# Patient Record
Sex: MALE | Race: BLACK OR AFRICAN AMERICAN | NOT HISPANIC OR LATINO
[De-identification: names, ages, dates, MRNs, and addresses within clinical notes are randomized per-mention and may not be internally consistent; named-entity substitution may affect disease eponyms.]

---

## 2017-06-13 PROBLEM — Z00.00 ENCOUNTER FOR PREVENTIVE HEALTH EXAMINATION: Status: ACTIVE | Noted: 2017-06-13

## 2017-06-20 ENCOUNTER — APPOINTMENT (OUTPATIENT)
Dept: ORTHOPEDIC SURGERY | Facility: CLINIC | Age: 72
End: 2017-06-20

## 2017-06-20 DIAGNOSIS — Z78.9 OTHER SPECIFIED HEALTH STATUS: ICD-10-CM

## 2017-06-20 DIAGNOSIS — Z86.39 PERSONAL HISTORY OF OTHER ENDOCRINE, NUTRITIONAL AND METABOLIC DISEASE: ICD-10-CM

## 2017-06-20 DIAGNOSIS — Z86.79 PERSONAL HISTORY OF OTHER DISEASES OF THE CIRCULATORY SYSTEM: ICD-10-CM

## 2017-06-20 DIAGNOSIS — Z85.46 PERSONAL HISTORY OF MALIGNANT NEOPLASM OF PROSTATE: ICD-10-CM

## 2017-06-21 VITALS
HEART RATE: 97 BPM | DIASTOLIC BLOOD PRESSURE: 77 MMHG | BODY MASS INDEX: 31.34 KG/M2 | SYSTOLIC BLOOD PRESSURE: 119 MMHG | HEIGHT: 66 IN | WEIGHT: 195 LBS

## 2017-07-18 ENCOUNTER — APPOINTMENT (OUTPATIENT)
Dept: ORTHOPEDIC SURGERY | Facility: CLINIC | Age: 72
End: 2017-07-18

## 2017-07-18 DIAGNOSIS — M84.521A PATHOLOGICAL FRACTURE IN NEOPLASTIC DISEASE, RIGHT HUMERUS, INITIAL ENCOUNTER FOR FRACTURE: ICD-10-CM

## 2017-07-18 PROBLEM — Z78.9 DOES NOT USE ILLICIT DRUGS: Status: ACTIVE | Noted: 2017-06-21

## 2017-07-18 PROBLEM — Z86.79 HISTORY OF HYPERTENSION: Status: RESOLVED | Noted: 2017-06-21 | Resolved: 2017-07-18

## 2017-07-18 PROBLEM — Z78.9 CONSUMES ALCOHOL OCCASIONALLY: Status: ACTIVE | Noted: 2017-06-21

## 2017-07-18 PROBLEM — Z78.9 EXERCISES OCCASIONALLY: Status: ACTIVE | Noted: 2017-06-21

## 2017-07-18 PROBLEM — Z86.39 HISTORY OF DIABETES MELLITUS: Status: RESOLVED | Noted: 2017-06-21 | Resolved: 2017-07-18

## 2017-07-18 PROBLEM — Z85.46 HISTORY OF MALIGNANT NEOPLASM OF PROSTATE: Status: RESOLVED | Noted: 2017-06-21 | Resolved: 2017-07-18

## 2017-07-18 PROBLEM — Z78.9 CURRENT NON-SMOKER: Status: ACTIVE | Noted: 2017-06-21

## 2017-07-18 RX ORDER — BICALUTAMIDE 50 MG/1
TABLET ORAL
Refills: 0 | Status: ACTIVE | COMMUNITY

## 2017-07-18 RX ORDER — ACARBOSE 25 MG/1
TABLET ORAL
Refills: 0 | Status: ACTIVE | COMMUNITY

## 2017-07-18 RX ORDER — SITAGLIPTIN AND METFORMIN HYDROCHLORIDE 50; 1000 MG/1; MG/1
TABLET, FILM COATED ORAL
Refills: 0 | Status: ACTIVE | COMMUNITY

## 2017-07-18 RX ORDER — PYRITHIONE ZINC 1 G/ML
SHAMPOO TOPICAL
Refills: 0 | Status: ACTIVE | COMMUNITY

## 2017-07-18 RX ORDER — PREDNISONE 50 MG/1
TABLET ORAL
Refills: 0 | Status: ACTIVE | COMMUNITY

## 2017-07-18 RX ORDER — SIMVASTATIN 80 MG/1
TABLET, FILM COATED ORAL
Refills: 0 | Status: ACTIVE | COMMUNITY

## 2017-07-18 RX ORDER — PETROLATUM,WHITE/LANOLIN
OINTMENT (GRAM) TOPICAL
Refills: 0 | Status: ACTIVE | COMMUNITY

## 2017-07-18 RX ORDER — LEUPROLIDE ACETATE 45 MG
KIT INTRAMUSCULAR
Refills: 0 | Status: ACTIVE | COMMUNITY

## 2017-07-18 RX ORDER — FEBUXOSTAT 80 MG/1
TABLET ORAL
Refills: 0 | Status: ACTIVE | COMMUNITY

## 2017-07-18 RX ORDER — INSULIN GLARGINE 100 [IU]/ML
INJECTION, SOLUTION SUBCUTANEOUS
Refills: 0 | Status: ACTIVE | COMMUNITY

## 2017-07-18 RX ORDER — ENALAPRIL MALEATE 5 MG/1
TABLET ORAL
Refills: 0 | Status: ACTIVE | COMMUNITY

## 2017-07-18 RX ORDER — ASPIRIN 325 MG/1
TABLET, FILM COATED ORAL
Refills: 0 | Status: ACTIVE | COMMUNITY

## 2017-09-15 ENCOUNTER — OUTPATIENT (OUTPATIENT)
Dept: OUTPATIENT SERVICES | Facility: HOSPITAL | Age: 72
LOS: 1 days | End: 2017-09-15

## 2017-09-15 VITALS
HEART RATE: 100 BPM | TEMPERATURE: 97 F | SYSTOLIC BLOOD PRESSURE: 110 MMHG | RESPIRATION RATE: 16 BRPM | WEIGHT: 184.09 LBS | DIASTOLIC BLOOD PRESSURE: 70 MMHG | HEIGHT: 67.5 IN

## 2017-09-15 DIAGNOSIS — I10 ESSENTIAL (PRIMARY) HYPERTENSION: ICD-10-CM

## 2017-09-15 DIAGNOSIS — Z98.890 OTHER SPECIFIED POSTPROCEDURAL STATES: Chronic | ICD-10-CM

## 2017-09-15 DIAGNOSIS — S42.309A UNSPECIFIED FRACTURE OF SHAFT OF HUMERUS, UNSPECIFIED ARM, INITIAL ENCOUNTER FOR CLOSED FRACTURE: ICD-10-CM

## 2017-09-15 DIAGNOSIS — E11.9 TYPE 2 DIABETES MELLITUS WITHOUT COMPLICATIONS: ICD-10-CM

## 2017-09-15 DIAGNOSIS — M84.521K: ICD-10-CM

## 2017-09-15 LAB
BLD GP AB SCN SERPL QL: NEGATIVE — SIGNIFICANT CHANGE UP
RH IG SCN BLD-IMP: POSITIVE — SIGNIFICANT CHANGE UP

## 2017-09-15 RX ORDER — GLIMEPIRIDE 1 MG
1 TABLET ORAL
Qty: 0 | Refills: 0 | COMMUNITY

## 2017-09-15 RX ORDER — ACARBOSE 25 MG/1
1 TABLET ORAL
Qty: 0 | Refills: 0 | COMMUNITY

## 2017-09-15 NOTE — H&P PST ADULT - MUSCULOSKELETAL
details… detailed exam decreased ROM due to pain/diminished strength/right arm & right leg/decreased ROM/no joint warmth/no calf tenderness/ROM intact

## 2017-09-15 NOTE — H&P PST ADULT - ATTENDING COMMENTS
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  Jet Alfred MD  Musculoskeletal Oncology  [phone]

## 2017-09-15 NOTE — H&P PST ADULT - NEGATIVE ENMT SYMPTOMS
no hearing difficulty/no nose bleeds/no vertigo/no nasal discharge/no recurrent cold sores/no sinus symptoms/no tinnitus/no post-nasal discharge/no ear pain

## 2017-09-15 NOTE — H&P PST ADULT - PROBLEM SELECTOR PLAN 2
instructed to take 27 units Lantus (80%) PM prior to surgery & last dose Janumet Day prior to surgery. No Insulin or Janumet AM of surgery  Monitor Blood Glucose stat AM of surgery

## 2017-09-15 NOTE — H&P PST ADULT - NSANTHOSAYNRD_GEN_A_CORE
No. GARFIELD screening performed.  STOP BANG Legend: 0-2 = LOW Risk; 3-4 = INTERMEDIATE Risk; 5-8 = HIGH Risk

## 2017-09-15 NOTE — H&P PST ADULT - RS GEN PE MLT RESP DETAILS PC
airway patent/good air movement/clear to auscultation bilaterally/no rales/respirations non-labored/breath sounds equal/no rhonchi

## 2017-09-15 NOTE — H&P PST ADULT - PROBLEM SELECTOR PLAN 1
scheduled for ORIF right humerus, allograft on 09/20/17  pending type & screen, cardiac clearance from Dr Acuña   Surgical scrub & preop instructions given & explained, pt verbalized understanding

## 2017-09-15 NOTE — H&P PST ADULT - PMH
DM (diabetes mellitus)    DVT (deep venous thrombosis)  Right leg - 10 years ago  HTN (hypertension)    Hyperlipidemia    Prostate cancer  radiation & chemotherapy Diabetic neuropathy    DM (diabetes mellitus)    DVT (deep venous thrombosis)  Right leg - 10 years ago  HTN (hypertension)    Hyperlipidemia    Prostate cancer  radiation & chemotherapy

## 2017-09-19 NOTE — ASU PATIENT PROFILE, ADULT - PMH
Diabetic neuropathy    DM (diabetes mellitus)    DVT (deep venous thrombosis)  Right leg - 10 years ago  HTN (hypertension)    Hyperlipidemia    Prostate cancer  radiation & chemotherapy

## 2017-09-20 ENCOUNTER — INPATIENT (INPATIENT)
Facility: HOSPITAL | Age: 72
LOS: 0 days | Discharge: HOME | End: 2017-09-21
Attending: ORTHOPAEDIC SURGERY | Admitting: ORTHOPAEDIC SURGERY
Payer: MEDICARE

## 2017-09-20 ENCOUNTER — APPOINTMENT (OUTPATIENT)
Dept: ORTHOPEDIC SURGERY | Facility: HOSPITAL | Age: 72
End: 2017-09-20

## 2017-09-20 ENCOUNTER — TRANSCRIPTION ENCOUNTER (OUTPATIENT)
Age: 72
End: 2017-09-20

## 2017-09-20 ENCOUNTER — RESULT REVIEW (OUTPATIENT)
Age: 72
End: 2017-09-20

## 2017-09-20 VITALS
SYSTOLIC BLOOD PRESSURE: 95 MMHG | OXYGEN SATURATION: 100 % | HEIGHT: 67.5 IN | DIASTOLIC BLOOD PRESSURE: 63 MMHG | HEART RATE: 86 BPM | RESPIRATION RATE: 16 BRPM | TEMPERATURE: 99 F | WEIGHT: 184.09 LBS

## 2017-09-20 DIAGNOSIS — Z98.890 OTHER SPECIFIED POSTPROCEDURAL STATES: Chronic | ICD-10-CM

## 2017-09-20 DIAGNOSIS — M84.521K: ICD-10-CM

## 2017-09-20 LAB
GRAM STN WND: SIGNIFICANT CHANGE UP
RH IG SCN BLD-IMP: POSITIVE — SIGNIFICANT CHANGE UP
SPECIMEN SOURCE: SIGNIFICANT CHANGE UP

## 2017-09-20 PROCEDURE — 24430 RPR NON/MAL HUMERUS WO GRAFT: CPT | Mod: RT

## 2017-09-20 PROCEDURE — 76000 FLUOROSCOPY <1 HR PHYS/QHP: CPT | Mod: 26

## 2017-09-20 PROCEDURE — 88341 IMHCHEM/IMCYTCHM EA ADD ANTB: CPT | Mod: 26

## 2017-09-20 PROCEDURE — 88305 TISSUE EXAM BY PATHOLOGIST: CPT | Mod: 26

## 2017-09-20 PROCEDURE — 88342 IMHCHEM/IMCYTCHM 1ST ANTB: CPT | Mod: 26

## 2017-09-20 PROCEDURE — 38221 DX BONE MARROW BIOPSIES: CPT

## 2017-09-20 PROCEDURE — 88311 DECALCIFY TISSUE: CPT | Mod: 26

## 2017-09-20 PROCEDURE — 64708 REVISE ARM/LEG NERVE: CPT | Mod: 59,RT

## 2017-09-20 RX ORDER — OXYCODONE HYDROCHLORIDE 5 MG/1
1 TABLET ORAL
Qty: 0 | Refills: 0 | COMMUNITY

## 2017-09-20 RX ORDER — SENNA PLUS 8.6 MG/1
2 TABLET ORAL AT BEDTIME
Qty: 0 | Refills: 0 | Status: DISCONTINUED | OUTPATIENT
Start: 2017-09-20 | End: 2017-09-21

## 2017-09-20 RX ORDER — ONDANSETRON 8 MG/1
4 TABLET, FILM COATED ORAL EVERY 6 HOURS
Qty: 0 | Refills: 0 | Status: DISCONTINUED | OUTPATIENT
Start: 2017-09-20 | End: 2017-09-21

## 2017-09-20 RX ORDER — OXYCODONE HYDROCHLORIDE 5 MG/1
5 TABLET ORAL EVERY 4 HOURS
Qty: 0 | Refills: 0 | Status: DISCONTINUED | OUTPATIENT
Start: 2017-09-20 | End: 2017-09-20

## 2017-09-20 RX ORDER — FENTANYL CITRATE 50 UG/ML
50 INJECTION INTRAVENOUS
Qty: 0 | Refills: 0 | Status: DISCONTINUED | OUTPATIENT
Start: 2017-09-20 | End: 2017-09-20

## 2017-09-20 RX ORDER — ATORVASTATIN CALCIUM 80 MG/1
1 TABLET, FILM COATED ORAL
Qty: 0 | Refills: 0 | COMMUNITY

## 2017-09-20 RX ORDER — MORPHINE SULFATE 50 MG/1
4 CAPSULE, EXTENDED RELEASE ORAL EVERY 4 HOURS
Qty: 0 | Refills: 0 | Status: DISCONTINUED | OUTPATIENT
Start: 2017-09-20 | End: 2017-09-20

## 2017-09-20 RX ORDER — ACETAMINOPHEN 500 MG
2 TABLET ORAL
Qty: 0 | Refills: 0 | COMMUNITY

## 2017-09-20 RX ORDER — INFLUENZA VIRUS VACCINE 15; 15; 15; 15 UG/.5ML; UG/.5ML; UG/.5ML; UG/.5ML
0.5 SUSPENSION INTRAMUSCULAR ONCE
Qty: 0 | Refills: 0 | Status: COMPLETED | OUTPATIENT
Start: 2017-09-20 | End: 2017-09-21

## 2017-09-20 RX ORDER — INSULIN GLARGINE 100 [IU]/ML
34 INJECTION, SOLUTION SUBCUTANEOUS AT BEDTIME
Qty: 0 | Refills: 0 | Status: DISCONTINUED | OUTPATIENT
Start: 2017-09-20 | End: 2017-09-21

## 2017-09-20 RX ORDER — OXYCODONE HYDROCHLORIDE 5 MG/1
5 TABLET ORAL EVERY 4 HOURS
Qty: 0 | Refills: 0 | Status: DISCONTINUED | OUTPATIENT
Start: 2017-09-20 | End: 2017-09-21

## 2017-09-20 RX ORDER — DEXTROSE 50 % IN WATER 50 %
25 SYRINGE (ML) INTRAVENOUS ONCE
Qty: 0 | Refills: 0 | Status: DISCONTINUED | OUTPATIENT
Start: 2017-09-20 | End: 2017-09-21

## 2017-09-20 RX ORDER — MORPHINE SULFATE 50 MG/1
2 CAPSULE, EXTENDED RELEASE ORAL EVERY 4 HOURS
Qty: 0 | Refills: 0 | Status: DISCONTINUED | OUTPATIENT
Start: 2017-09-20 | End: 2017-09-21

## 2017-09-20 RX ORDER — GLUCAGON INJECTION, SOLUTION 0.5 MG/.1ML
1 INJECTION, SOLUTION SUBCUTANEOUS ONCE
Qty: 0 | Refills: 0 | Status: DISCONTINUED | OUTPATIENT
Start: 2017-09-20 | End: 2017-09-21

## 2017-09-20 RX ORDER — SODIUM CHLORIDE 9 MG/ML
1000 INJECTION, SOLUTION INTRAVENOUS
Qty: 0 | Refills: 0 | Status: DISCONTINUED | OUTPATIENT
Start: 2017-09-20 | End: 2017-09-21

## 2017-09-20 RX ORDER — OXYCODONE HYDROCHLORIDE 5 MG/1
10 TABLET ORAL EVERY 4 HOURS
Qty: 0 | Refills: 0 | Status: DISCONTINUED | OUTPATIENT
Start: 2017-09-20 | End: 2017-09-21

## 2017-09-20 RX ORDER — ASCORBIC ACID 60 MG
5 TABLET,CHEWABLE ORAL
Qty: 0 | Refills: 0 | COMMUNITY

## 2017-09-20 RX ORDER — SODIUM CHLORIDE 9 MG/ML
500 INJECTION, SOLUTION INTRAVENOUS
Qty: 0 | Refills: 0 | Status: DISCONTINUED | OUTPATIENT
Start: 2017-09-20 | End: 2017-09-21

## 2017-09-20 RX ORDER — POLYETHYLENE GLYCOL 3350 17 G/17G
17 POWDER, FOR SOLUTION ORAL DAILY
Qty: 0 | Refills: 0 | Status: DISCONTINUED | OUTPATIENT
Start: 2017-09-20 | End: 2017-09-21

## 2017-09-20 RX ORDER — SODIUM CHLORIDE 9 MG/ML
1000 INJECTION, SOLUTION INTRAVENOUS
Qty: 0 | Refills: 0 | Status: DISCONTINUED | OUTPATIENT
Start: 2017-09-20 | End: 2017-09-20

## 2017-09-20 RX ORDER — DEXTROSE 50 % IN WATER 50 %
1 SYRINGE (ML) INTRAVENOUS ONCE
Qty: 0 | Refills: 0 | Status: DISCONTINUED | OUTPATIENT
Start: 2017-09-20 | End: 2017-09-21

## 2017-09-20 RX ORDER — ATORVASTATIN CALCIUM 80 MG/1
20 TABLET, FILM COATED ORAL AT BEDTIME
Qty: 0 | Refills: 0 | Status: DISCONTINUED | OUTPATIENT
Start: 2017-09-20 | End: 2017-09-21

## 2017-09-20 RX ORDER — INSULIN GLARGINE 100 [IU]/ML
34 INJECTION, SOLUTION SUBCUTANEOUS
Qty: 0 | Refills: 0 | COMMUNITY

## 2017-09-20 RX ORDER — SITAGLIPTIN AND METFORMIN HYDROCHLORIDE 500; 50 MG/1; MG/1
1 TABLET, FILM COATED ORAL
Qty: 0 | Refills: 0 | COMMUNITY

## 2017-09-20 RX ORDER — OXYCODONE HYDROCHLORIDE 5 MG/1
2.5 TABLET ORAL EVERY 4 HOURS
Qty: 0 | Refills: 0 | Status: DISCONTINUED | OUTPATIENT
Start: 2017-09-20 | End: 2017-09-21

## 2017-09-20 RX ORDER — OMEGA-3 ACID ETHYL ESTERS 1 G
1 CAPSULE ORAL
Qty: 0 | Refills: 0 | COMMUNITY

## 2017-09-20 RX ORDER — OXYCODONE HYDROCHLORIDE 5 MG/1
10 TABLET ORAL EVERY 4 HOURS
Qty: 0 | Refills: 0 | Status: DISCONTINUED | OUTPATIENT
Start: 2017-09-20 | End: 2017-09-20

## 2017-09-20 RX ORDER — DOCUSATE SODIUM 100 MG
100 CAPSULE ORAL THREE TIMES A DAY
Qty: 0 | Refills: 0 | Status: DISCONTINUED | OUTPATIENT
Start: 2017-09-20 | End: 2017-09-21

## 2017-09-20 RX ORDER — ASPIRIN/CALCIUM CARB/MAGNESIUM 324 MG
1 TABLET ORAL
Qty: 0 | Refills: 0 | COMMUNITY

## 2017-09-20 RX ORDER — ACETAMINOPHEN 500 MG
650 TABLET ORAL EVERY 6 HOURS
Qty: 0 | Refills: 0 | Status: DISCONTINUED | OUTPATIENT
Start: 2017-09-20 | End: 2017-09-21

## 2017-09-20 RX ORDER — INSULIN LISPRO 100/ML
VIAL (ML) SUBCUTANEOUS
Qty: 0 | Refills: 0 | Status: DISCONTINUED | OUTPATIENT
Start: 2017-09-20 | End: 2017-09-21

## 2017-09-20 RX ORDER — INSULIN LISPRO 100/ML
VIAL (ML) SUBCUTANEOUS AT BEDTIME
Qty: 0 | Refills: 0 | Status: DISCONTINUED | OUTPATIENT
Start: 2017-09-20 | End: 2017-09-21

## 2017-09-20 RX ORDER — FAMOTIDINE 10 MG/ML
20 INJECTION INTRAVENOUS
Qty: 0 | Refills: 0 | Status: DISCONTINUED | OUTPATIENT
Start: 2017-09-20 | End: 2017-09-21

## 2017-09-20 RX ORDER — OXYCODONE HYDROCHLORIDE 5 MG/1
1 TABLET ORAL
Qty: 40 | Refills: 0 | OUTPATIENT
Start: 2017-09-20

## 2017-09-20 RX ORDER — DEXTROSE 50 % IN WATER 50 %
12.5 SYRINGE (ML) INTRAVENOUS ONCE
Qty: 0 | Refills: 0 | Status: DISCONTINUED | OUTPATIENT
Start: 2017-09-20 | End: 2017-09-21

## 2017-09-20 RX ADMIN — OXYCODONE HYDROCHLORIDE 10 MILLIGRAM(S): 5 TABLET ORAL at 19:36

## 2017-09-20 RX ADMIN — Medication 100 MILLIGRAM(S): at 21:27

## 2017-09-20 RX ADMIN — ATORVASTATIN CALCIUM 20 MILLIGRAM(S): 80 TABLET, FILM COATED ORAL at 21:27

## 2017-09-20 RX ADMIN — OXYCODONE HYDROCHLORIDE 10 MILLIGRAM(S): 5 TABLET ORAL at 23:36

## 2017-09-20 RX ADMIN — SENNA PLUS 2 TABLET(S): 8.6 TABLET ORAL at 21:28

## 2017-09-20 RX ADMIN — SODIUM CHLORIDE 65 MILLILITER(S): 9 INJECTION, SOLUTION INTRAVENOUS at 21:27

## 2017-09-20 RX ADMIN — SODIUM CHLORIDE 3000 MILLILITER(S): 9 INJECTION, SOLUTION INTRAVENOUS at 13:55

## 2017-09-20 RX ADMIN — OXYCODONE HYDROCHLORIDE 10 MILLIGRAM(S): 5 TABLET ORAL at 20:10

## 2017-09-20 RX ADMIN — Medication 650 MILLIGRAM(S): at 23:37

## 2017-09-20 NOTE — ASU DISCHARGE PLAN (ADULT/PEDIATRIC). - NOTIFY
Swelling that continues/Bleeding that does not stop/Pain not relieved by Medications/Numbness, color, or temperature change to extremity

## 2017-09-20 NOTE — BRIEF OPERATIVE NOTE - PROCEDURE
<<-----Click on this checkbox to enter Procedure Open reduction and internal fixation (ORIF) of fracture of humerus using plate  09/20/2017    Active  SWEINER

## 2017-09-20 NOTE — ASU DISCHARGE PLAN (ADULT/PEDIATRIC). - MEDICATION SUMMARY - MEDICATIONS TO TAKE
I will START or STAY ON the medications listed below when I get home from the hospital:    iron 65mg daily  -- Indication: For Per pmd    natures immune stimulator one tab daily last dose 9/15  -- Indication: For Per pmd    high potency prostate one a day last dose 9/15  -- Indication: For Per pmd    black walnut 2 caps daily last dose 9/15  -- Indication: For Per pmd    food enzymes two tabs daily last dose 9/15  -- Indication: For Per pmd    trigger immune 3 tabs daily last dose 9/15  -- Indication: For Per pmd    lung support 3 tabs daily last dose 9/15  -- Indication: For Per pmd    One a Day mens daily last dose 9/15  -- Indication: For Perpmd    E tea 2 tabs daily last dose 9/15  -- Indication: For Per pmd    oxyCODONE 5 mg oral tablet  -- 1 tab(s) by mouth every 4 to 6 hours MDD:6 As needed Pain  -- Caution federal law prohibits the transfer of this drug to any person other  than the person for whom it was prescribed.  It is very important that you take or use this exactly as directed.  Do not skip doses or discontinue unless directed by your doctor.  May cause drowsiness.  Alcohol may intensify this effect.  Use care when operating dangerous machinery.  This prescription cannot be refilled.  Using more of this medication than prescribed may cause serious breathing problems.    -- Indication: For Pain    aspirin 81 mg oral delayed release tablet  -- 1 tab(s) by mouth once a day last dose 9/15  -- Indication: For Per pmd    enalapril 10 mg oral tablet  -- 1 tab(s) by mouth once a day  -- Indication: For Per pmd    Lantus 100 units/mL subcutaneous solution  -- 34 unit(s) subcutaneous once a day (at bedtime)  -- Indication: For Per pmd    Janumet 50 mg-1000 mg oral tablet  -- 1 tab(s) by mouth 2 times a day  -- Indication: For Per pmd    atorvastatin 20 mg oral tablet  -- 1 tab(s) by mouth once a day  -- Indication: For Per pmd    Fish Oil 1000 mg oral capsule  -- 1 cap(s) by mouth once a day last dose 9/15  -- Indication: For Per pmd    Calcium 600+D oral tablet  -- 1 tab(s) by mouth 2 times a day  -- Indication: For Per pmd    Vitamin C  -- 5 tab(s) by mouth once a day  -- Indication: For Per pmd

## 2017-09-20 NOTE — PROGRESS NOTE ADULT - SUBJECTIVE AND OBJECTIVE BOX
ORTHO POC    Patient resting comfortably without complaint s/p right humerus open reduction internal fixation  today      Vital Signs Last 24 Hrs  T(C): 36.6 (20 Sep 2017 16:00), Max: 37.1 (20 Sep 2017 08:53)  T(F): 97.9 (20 Sep 2017 16:00), Max: 98.8 (20 Sep 2017 08:53)  HR: 80 (20 Sep 2017 16:00) (76 - 99)  BP: 94/61 (20 Sep 2017 16:00) (82/59 - 96/64)  BP(mean): --  RR: 18 (20 Sep 2017 16:00) (12 - 18)  SpO2: 100% (20 Sep 2017 16:00) (94% - 100%)      right upper extremity :  Dressing clean/ dry/intact + sling                      Median/ Ulnar/Radial nerve function  motor and sensory decreased secondary to local block                       Radial pulse 2+     U/O: dtv      A/P Stable postop           PT- nonweight bearing right upper extremity          Pain control          DVT prophylaxis-  venodynes

## 2017-09-20 NOTE — PATIENT PROFILE ADULT. - NUTRITION PROFILE
do you have any questions about your prescribed diet?/Failure to Thrive/wife describes slow loss of appetite

## 2017-09-21 ENCOUNTER — TRANSCRIPTION ENCOUNTER (OUTPATIENT)
Age: 72
End: 2017-09-21

## 2017-09-21 VITALS
TEMPERATURE: 99 F | SYSTOLIC BLOOD PRESSURE: 111 MMHG | HEART RATE: 101 BPM | OXYGEN SATURATION: 96 % | DIASTOLIC BLOOD PRESSURE: 68 MMHG | RESPIRATION RATE: 18 BRPM

## 2017-09-21 LAB — HBA1C BLD-MCNC: 5.4 % — SIGNIFICANT CHANGE UP (ref 4–5.6)

## 2017-09-21 RX ADMIN — INSULIN GLARGINE 34 UNIT(S): 100 INJECTION, SOLUTION SUBCUTANEOUS at 10:08

## 2017-09-21 RX ADMIN — OXYCODONE HYDROCHLORIDE 10 MILLIGRAM(S): 5 TABLET ORAL at 14:37

## 2017-09-21 RX ADMIN — Medication 100 MILLIGRAM(S): at 14:37

## 2017-09-21 RX ADMIN — OXYCODONE HYDROCHLORIDE 10 MILLIGRAM(S): 5 TABLET ORAL at 06:00

## 2017-09-21 RX ADMIN — OXYCODONE HYDROCHLORIDE 10 MILLIGRAM(S): 5 TABLET ORAL at 15:20

## 2017-09-21 RX ADMIN — OXYCODONE HYDROCHLORIDE 10 MILLIGRAM(S): 5 TABLET ORAL at 11:00

## 2017-09-21 RX ADMIN — OXYCODONE HYDROCHLORIDE 10 MILLIGRAM(S): 5 TABLET ORAL at 00:20

## 2017-09-21 RX ADMIN — OXYCODONE HYDROCHLORIDE 10 MILLIGRAM(S): 5 TABLET ORAL at 10:07

## 2017-09-21 RX ADMIN — Medication 650 MILLIGRAM(S): at 14:37

## 2017-09-21 RX ADMIN — OXYCODONE HYDROCHLORIDE 10 MILLIGRAM(S): 5 TABLET ORAL at 05:16

## 2017-09-21 RX ADMIN — FAMOTIDINE 20 MILLIGRAM(S): 10 INJECTION INTRAVENOUS at 05:16

## 2017-09-21 RX ADMIN — Medication 650 MILLIGRAM(S): at 05:16

## 2017-09-21 RX ADMIN — INFLUENZA VIRUS VACCINE 0.5 MILLILITER(S): 15; 15; 15; 15 SUSPENSION INTRAMUSCULAR at 11:20

## 2017-09-21 RX ADMIN — Medication 100 MILLIGRAM(S): at 05:11

## 2017-09-21 NOTE — DISCHARGE NOTE ADULT - PLAN OF CARE
fracture fixation non weight bearing right upper extremity keep in sling at all times   resume same diet as prior to surgery   Dr Alfred 1 week call for andreea [phone]

## 2017-09-21 NOTE — OCCUPATIONAL THERAPY INITIAL EVALUATION ADULT - GENERAL OBSERVATIONS, REHAB EVAL
Pt received in semisupine position. + right UE sling. + IV fluids. + right LE edema (baseline per pt). + right wrist drop ( INES Brand assessed RUE during OT evaluation)

## 2017-09-21 NOTE — OCCUPATIONAL THERAPY INITIAL EVALUATION ADULT - ADDITIONAL COMMENTS
Pt reports that prior right humerus fx, was independent with ADLs. However since then has had decreased functional use of RUE which has increased his reliance on his spouse to perform ADLs.

## 2017-09-21 NOTE — PHYSICAL THERAPY INITIAL EVALUATION ADULT - ACTIVE RANGE OF MOTION EXAMINATION, REHAB EVAL
Left UE Active ROM was WFL (within functional limits)/bilateral  lower extremity Active ROM was WFL (within functional limits)/R UE: n/a

## 2017-09-21 NOTE — DISCHARGE NOTE ADULT - CARE PLAN
Principal Discharge DX:	Humerus fracture  Goal:	fracture fixation  Instructions for follow-up, activity and diet:	non weight bearing right upper extremity keep in sling at all times   resume same diet as prior to surgery   Dr Alfred 1 week call for andreea [phone]

## 2017-09-21 NOTE — DISCHARGE NOTE ADULT - CARE PROVIDER_API CALL
Jet Alfred (MD), Orthopaedic Surgery  [address] [place] [address]  Phone: [phone]  Fax: [phone]

## 2017-09-21 NOTE — DISCHARGE NOTE ADULT - HOSPITAL COURSE
71 yo is s/p  above without any intraoperative complications.  Pt is doing well and stable for transfer to home . call surgeon's office one week to make appt. D/C sutures/staples POD 14

## 2017-09-21 NOTE — DISCHARGE NOTE ADULT - MEDICATION SUMMARY - MEDICATIONS TO TAKE
I will START or STAY ON the medications listed below when I get home from the hospital:    iron 65mg daily  -- Indication: For supplement    natures immune stimulator one tab daily last dose 9/15  -- Indication: For supplement    high potency prostate one a day last dose 9/15  -- Indication: For supplement    black walnut 2 caps daily last dose 9/15  -- Indication: For supplement    food enzymes two tabs daily last dose 9/15  -- Indication: For supplement    trigger immune 3 tabs daily last dose 9/15  -- Indication: For supplement    lung support 3 tabs daily last dose 9/15  -- Indication: For supplement    One a Day mens daily last dose 9/15  -- Indication: For vitamin    E tea 2 tabs daily last dose 9/15  -- Indication: For vitamin    oxyCODONE 5 mg oral tablet  -- 1 tab(s) by mouth every 4 to 6 hours MDD:6 As needed Pain  -- Caution federal law prohibits the transfer of this drug to any person other  than the person for whom it was prescribed.  It is very important that you take or use this exactly as directed.  Do not skip doses or discontinue unless directed by your doctor.  May cause drowsiness.  Alcohol may intensify this effect.  Use care when operating dangerous machinery.  This prescription cannot be refilled.  Using more of this medication than prescribed may cause serious breathing problems.    -- Indication: For Pain    aspirin 81 mg oral delayed release tablet  -- 1 tab(s) by mouth once a day last dose 9/15  -- Indication: For Home med    enalapril 10 mg oral tablet  -- 1 tab(s) by mouth once a day  -- Indication: For Home med    Lantus 100 units/mL subcutaneous solution  -- 34 unit(s) subcutaneous once a day (at bedtime)  -- Indication: For DM (diabetes mellitus)    Janumet 50 mg-1000 mg oral tablet  -- 1 tab(s) by mouth 2 times a day  -- Indication: For DM (diabetes mellitus)    atorvastatin 20 mg oral tablet  -- 1 tab(s) by mouth once a day  -- Indication: For chol    Fish Oil 1000 mg oral capsule  -- 1 cap(s) by mouth once a day last dose 9/15  -- Indication: For supplement    Calcium 600+D oral tablet  -- 1 tab(s) by mouth 2 times a day  -- Indication: For vitamin    Vitamin C  -- 5 tab(s) by mouth once a day  -- Indication: For vitamin

## 2017-09-21 NOTE — PROGRESS NOTE ADULT - SUBJECTIVE AND OBJECTIVE BOX
ORTHO PROGRESS NOTE     Patient resting comfortable without complaint, block beginning to wear off       Vital Signs Last 24 Hrs  T(C): 37.1 (21 Sep 2017 05:09), Max: 37.2 (20 Sep 2017 21:16)  T(F): 98.7 (21 Sep 2017 05:09), Max: 98.9 (20 Sep 2017 21:16)  HR: 97 (21 Sep 2017 05:09) (76 - 99)  BP: 95/53 (21 Sep 2017 05:09) (82/59 - 118/61)  BP(mean): --  RR: 18 (21 Sep 2017 05:09) (12 - 19)  SpO2: 96% (21 Sep 2017 05:09) (94% - 100%)    right upper extremity : Dressing clean/dry/intact                                    Median/ Ulnar/ Radial nerve function motor and sensory still decreased secondary to block                                     Radial pulse 2+     A/P:  Stable             PT- non weight bearing __ upper extremity                               DVT prophylaxis venodynes             D/C planning            Pain control             Incentive spirometer

## 2017-09-21 NOTE — OCCUPATIONAL THERAPY INITIAL EVALUATION ADULT - DIAGNOSIS, OT EVAL
s/p right humerus ORIF; right wrist drop, right LE swelling (baseline), decreased functional mobility, decreased  ADl independence s/p right humerus ORIF; right wrist drop, right LE swelling (baseline), decreased functional mobility, decreased  ADL independence

## 2017-09-21 NOTE — DISCHARGE NOTE ADULT - PATIENT PORTAL LINK FT
“You can access the Follow[employer] Patient Portal, offered by [facility], by registering with the following website: [email or web address]”

## 2017-09-21 NOTE — OCCUPATIONAL THERAPY INITIAL EVALUATION ADULT - PLANNED THERAPY INTERVENTIONS, OT EVAL
transfer training/ADL retraining/balance training/bed mobility training/fine motor coordination training/ROM

## 2017-09-21 NOTE — OCCUPATIONAL THERAPY INITIAL EVALUATION ADULT - RUE MMT, REHAB EVAL
shoulder not tested, elbow at least 2-/5, wrist extension 1/5, wrist flexion 2-/5, composite flexion 3-/5

## 2017-09-21 NOTE — DISCHARGE NOTE ADULT - CONDITIONS AT DISCHARGE
stable stable, oob ambulating, tolerating diet, voiding without difficulty.  Decrease wrist extension.

## 2017-09-22 LAB — SPECIMEN SOURCE: SIGNIFICANT CHANGE UP

## 2017-09-26 LAB — CULTURE - SURGICAL SITE: SIGNIFICANT CHANGE UP

## 2017-10-03 ENCOUNTER — APPOINTMENT (OUTPATIENT)
Dept: ORTHOPEDIC SURGERY | Facility: CLINIC | Age: 72
End: 2017-10-03
Payer: MEDICARE

## 2017-10-03 PROCEDURE — 99024 POSTOP FOLLOW-UP VISIT: CPT

## 2017-10-19 LAB — FUNGUS SPEC QL CULT: SIGNIFICANT CHANGE UP

## 2017-11-07 ENCOUNTER — APPOINTMENT (OUTPATIENT)
Dept: ORTHOPEDIC SURGERY | Facility: CLINIC | Age: 72
End: 2017-11-07
Payer: MEDICARE

## 2017-11-07 PROCEDURE — 73060 X-RAY EXAM OF HUMERUS: CPT | Mod: RT

## 2017-11-07 PROCEDURE — 99024 POSTOP FOLLOW-UP VISIT: CPT

## 2018-01-16 ENCOUNTER — APPOINTMENT (OUTPATIENT)
Dept: ORTHOPEDIC SURGERY | Facility: CLINIC | Age: 73
End: 2018-01-16
Payer: MEDICARE

## 2018-01-16 PROCEDURE — 73060 X-RAY EXAM OF HUMERUS: CPT | Mod: RT

## 2018-01-16 PROCEDURE — 99213 OFFICE O/P EST LOW 20 MIN: CPT

## 2018-03-06 ENCOUNTER — APPOINTMENT (OUTPATIENT)
Dept: ORTHOPEDIC SURGERY | Facility: CLINIC | Age: 73
End: 2018-03-06
Payer: MEDICARE

## 2018-03-06 PROCEDURE — 73060 X-RAY EXAM OF HUMERUS: CPT | Mod: RT

## 2018-03-06 PROCEDURE — 99213 OFFICE O/P EST LOW 20 MIN: CPT

## 2018-06-05 ENCOUNTER — APPOINTMENT (OUTPATIENT)
Dept: ORTHOPEDIC SURGERY | Facility: CLINIC | Age: 73
End: 2018-06-05
Payer: MEDICARE

## 2018-06-05 DIAGNOSIS — T66.XXXS RADIATION SICKNESS, UNSPECIFIED, SEQUELA: ICD-10-CM

## 2018-06-05 DIAGNOSIS — M21.331 WRIST DROP, RIGHT WRIST: ICD-10-CM

## 2018-06-05 PROCEDURE — 99213 OFFICE O/P EST LOW 20 MIN: CPT

## 2018-06-05 PROCEDURE — 73060 X-RAY EXAM OF HUMERUS: CPT | Mod: RT

## 2018-07-16 PROBLEM — C61 MALIGNANT NEOPLASM OF PROSTATE: Chronic | Status: ACTIVE | Noted: 2017-09-15

## 2018-07-30 PROBLEM — E11.40 TYPE 2 DIABETES MELLITUS WITH DIABETIC NEUROPATHY, UNSPECIFIED: Chronic | Status: ACTIVE | Noted: 2017-09-15

## 2018-07-30 PROBLEM — I10 ESSENTIAL (PRIMARY) HYPERTENSION: Chronic | Status: ACTIVE | Noted: 2017-09-15

## 2018-07-30 PROBLEM — I82.409 ACUTE EMBOLISM AND THROMBOSIS OF UNSPECIFIED DEEP VEINS OF UNSPECIFIED LOWER EXTREMITY: Chronic | Status: ACTIVE | Noted: 2017-09-15

## 2018-07-30 PROBLEM — E78.5 HYPERLIPIDEMIA, UNSPECIFIED: Chronic | Status: ACTIVE | Noted: 2017-09-15

## 2018-07-30 PROBLEM — E11.9 TYPE 2 DIABETES MELLITUS WITHOUT COMPLICATIONS: Chronic | Status: ACTIVE | Noted: 2017-09-15

## 2018-08-21 ENCOUNTER — APPOINTMENT (OUTPATIENT)
Dept: ORTHOPEDIC SURGERY | Facility: CLINIC | Age: 73
End: 2018-08-21
Payer: MEDICARE

## 2018-08-21 DIAGNOSIS — M84.521K: ICD-10-CM

## 2018-08-21 DIAGNOSIS — M25.511 PAIN IN RIGHT SHOULDER: ICD-10-CM

## 2018-08-21 PROCEDURE — 73060 X-RAY EXAM OF HUMERUS: CPT | Mod: RT

## 2018-08-21 PROCEDURE — 20610 DRAIN/INJ JOINT/BURSA W/O US: CPT | Mod: RT

## 2018-08-21 PROCEDURE — 99213 OFFICE O/P EST LOW 20 MIN: CPT | Mod: 25

## 2019-02-05 ENCOUNTER — APPOINTMENT (OUTPATIENT)
Dept: ORTHOPEDIC SURGERY | Facility: CLINIC | Age: 74
End: 2019-02-05
Payer: MEDICARE

## 2019-02-05 PROCEDURE — 99213 OFFICE O/P EST LOW 20 MIN: CPT

## 2019-02-05 PROCEDURE — 73060 X-RAY EXAM OF HUMERUS: CPT | Mod: RT

## 2019-02-26 NOTE — PHYSICAL EXAM
[FreeTextEntry1] : On exam, he has minimal pain in the area of his arm. He has good range of motion of his elbow wrist and hand and his wrist drop has resolved. He feels overall stiffness but is feeling significantly better. [General Appearance - Well-Appearing] : Well appearing [General Appearance - Well Nourished] : well nourished [Shuffling] : shuffling [Cane] : Uses cane for ambulation [Tenderness] : tenderness [Swelling] : no swelling [Erythema] : no erythema [Masses] : no masses [Incision Healed - Describe:] : Incision is healed ~M [Normal] : Palpable radial pulse, warm and pink with brisk capillary refill. [Full UE ROM unless otherwise noted:] : Full range of motion unless otherwise noted. [UE Motor Strength Normal unless otherwise noted:] : 5/5 strength in bilateral upper extremities unless otherwise noted. [Motor Strength Wrists Right Weakness] : wrist weakness was present [Hand Weakness Right] : the hand  was weak [Motor Strength Fingers Right Hand Weakness] : finger weakness was present [Motor Strength Thumb Right Weakness] : thumb weakness was present

## 2019-02-26 NOTE — DISCUSSION/SUMMARY
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 He is currently stable now a year and a half after surgery and the hardware does not seem to be loosening. This is important to continue watching however at this point I would've him continue to follow this treatment. I will see him again in 6 months time unless he needs me sooner. He understands that the fracture is not completely united and may still be a problem in the future as post irradiated fractures can be.\par \par If imaging was ordered, the patient was told to make an appointment to review findings right after all imaging is completed.\par \par We discussed risks, benefits and alternatives. Rationale of care was reviewed and all questions were answered.

## 2019-02-26 NOTE — REASON FOR VISIT
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

## 2019-02-26 NOTE — REVIEW OF SYSTEMS
[Feeling Tired] : feeling tired [Lower Ext Edema] : lower extremity edema [SOB on Exertion] : shortness of breath during exertion [Joint Pain] : joint pain [Joint Stiffness] : joint stiffness [Feeling Weak] : feeling week [Nl] : Hematologic/Lymphatic

## 2019-02-26 NOTE — DATA REVIEWED
[Imaging Present] : Present [de-identified] : X-rays today show the ORIF in good position. There is still minimal bone growth in the area. The fracture line is clearly evident. There is no loosening seen around the screws however there still only minimal callus is seen. This is consistent with his previous irradiated fracture.

## 2019-02-26 NOTE — HISTORY OF PRESENT ILLNESS
[FreeTextEntry1] : Patient is here for routine followup. He is having no do arm pain and his swelling has slowly gotten better. He is still getting treatment for his disease. [Stable] : stable [1] : currently ~his/her~ pain is 1 out of 10

## 2020-01-30 NOTE — DISCHARGE NOTE ADULT - CARE PROVIDERS DIRECT ADDRESSES
[email or web address]
Pt with head injury on Monday with ecchymosis feeling well for 48 hours. No acute distress. Plan Zofran and PO challenge.

## 2020-08-18 NOTE — ASU DISCHARGE PLAN (ADULT/PEDIATRIC). - MEDICATION SUMMARY - MEDICATIONS TO STOP TAKING
Plan: Pared down with #15 blade Detail Level: Detailed I will STOP taking the medications listed below when I get home from the hospital:    oxycodone-acetaminophen 5 mg-500 mg oral tablet  -- orally 4 times a day, As Needed    acetaminophen 500 mg oral capsule  -- 2 tab(s) by mouth every 6 hours, As Needed    oxyCODONE 5 mg oral tablet  -- 1 tab(s) by mouth every 6 hours, As Needed

## 2021-06-11 NOTE — OCCUPATIONAL THERAPY INITIAL EVALUATION ADULT - PHYSICAL ASSIST/NONPHYSICAL ASSIST: SIT/STAND, REHAB EVAL
Return to WORK    June 11, 2021      Re:   Aris Anne  [address] [place] [id number]           This is to certify that Aris Anne has been under my care and can return to work on 6/14/21.    RESTRICTIONS: No more than 40 hour work week for next 2 weeks.    REMARKS:       SIGNATURE: ___________________________________________,   6/11/2021      KELSI Hoyos PA-C  [facility]  [address] [place] [address] [phone]  
1 person assist

## 2023-08-09 NOTE — DISCHARGE NOTE ADULT - HAS THE PATIENT RECEIVED THE INFLUENZA VACCINE DURING THIS VISIT
----- Message from Kyle De Los Santos sent at 8/9/2023 10:08 AM CDT -----  Type: orders    Who Called: pt  Would the patient rather a call back or a response via [facility]? call  Best Call Back Number: [phone]  Additional Information:  blood work, would like it sent over to clinic [address] in [facility], patient would like thyroid test included        Yes

## 2023-11-20 NOTE — H&P PST ADULT - HISTORY OF PRESENT ILLNESS
Prior Authorization Not Needed per Insurance    Medication: Rubin DAVISON Praluent 75MG/ML auto-injectors    Insurance Company: [employer] - Phone [phone] Fax [phone]  Expected CoPay:      Pharmacy Filling the Rx: [id number] - [place], [place] - [address]    Per call to plan- PA is effective and in place until further notice for Praluent.            71 y/o male with hx HTN, Hyperlipidemia, DM Type II, Prostate Cancer, s/p radiation & chemotherapy, Diabetic Neuropathy, Right Leg Lymphedema, presents for preop evaluation Pt reports that he slipped, fell & injured his right arm last May, 2016. Pt had x-ray of right arm & right humerus fracture seen. Pt was referred for further evaluation & surgery recommended. Presently right upper arm brace in place; arm severely swollen.

## 2024-02-15 NOTE — ASU PATIENT PROFILE, ADULT - TEACHING/LEARNING LEARNING PREFERENCES
Recorded Pressure: LV, PCW, HR=58, Condition=Condition 1 (Left Ventricle) /7/11, (Pulmonary Capillary Wedge) PCW 14/13/10 verbal instruction/written material

## 2025-05-19 NOTE — PATIENT PROFILE ADULT. - PAIN CHRONIC, PROFILE
Continue Regimen: Ketoconazole shampoo as face wash 2-3 times a day. Render In Strict Bullet Format?: No Detail Level: Zone yes